# Patient Record
Sex: FEMALE | Race: WHITE | NOT HISPANIC OR LATINO | Employment: FULL TIME | URBAN - METROPOLITAN AREA
[De-identification: names, ages, dates, MRNs, and addresses within clinical notes are randomized per-mention and may not be internally consistent; named-entity substitution may affect disease eponyms.]

---

## 2018-01-07 ENCOUNTER — HOSPITAL ENCOUNTER (EMERGENCY)
Facility: HOSPITAL | Age: 48
Discharge: HOME | End: 2018-01-07
Attending: EMERGENCY MEDICINE | Admitting: EMERGENCY MEDICINE

## 2018-01-07 ENCOUNTER — APPOINTMENT (OUTPATIENT)
Dept: GENERAL RADIOLOGY | Facility: HOSPITAL | Age: 48
End: 2018-01-07

## 2018-01-07 VITALS
OXYGEN SATURATION: 93 % | DIASTOLIC BLOOD PRESSURE: 70 MMHG | RESPIRATION RATE: 18 BRPM | WEIGHT: 200 LBS | HEART RATE: 64 BPM | SYSTOLIC BLOOD PRESSURE: 107 MMHG | BODY MASS INDEX: 32.14 KG/M2 | HEIGHT: 66 IN | TEMPERATURE: 97.6 F

## 2018-01-07 DIAGNOSIS — M62.830 SPASM OF BACK MUSCLES: Primary | ICD-10-CM

## 2018-01-07 DIAGNOSIS — R07.89 ACUTE CHEST WALL PAIN: ICD-10-CM

## 2018-01-07 LAB
ALBUMIN SERPL-MCNC: 4.2 G/DL (ref 3.2–4.8)
ALBUMIN/GLOB SERPL: 1.5 G/DL (ref 1.5–2.5)
ALP SERPL-CCNC: 68 U/L (ref 25–100)
ALT SERPL W P-5'-P-CCNC: 16 U/L (ref 7–40)
ANION GAP SERPL CALCULATED.3IONS-SCNC: 4 MMOL/L (ref 3–11)
AST SERPL-CCNC: 14 U/L (ref 0–33)
B-HCG UR QL: NEGATIVE
BASOPHILS # BLD AUTO: 0.05 10*3/MM3 (ref 0–0.2)
BASOPHILS NFR BLD AUTO: 0.4 % (ref 0–1)
BILIRUB SERPL-MCNC: 0.3 MG/DL (ref 0.3–1.2)
BNP SERPL-MCNC: 23 PG/ML (ref 0–100)
BUN BLD-MCNC: 18 MG/DL (ref 9–23)
BUN/CREAT SERPL: 22.5 (ref 7–25)
CALCIUM SPEC-SCNC: 8.9 MG/DL (ref 8.7–10.4)
CHLORIDE SERPL-SCNC: 105 MMOL/L (ref 99–109)
CO2 SERPL-SCNC: 27 MMOL/L (ref 20–31)
CREAT BLD-MCNC: 0.8 MG/DL (ref 0.6–1.3)
DEPRECATED RDW RBC AUTO: 43.1 FL (ref 37–54)
EOSINOPHIL # BLD AUTO: 0.33 10*3/MM3 (ref 0–0.3)
EOSINOPHIL NFR BLD AUTO: 2.9 % (ref 0–3)
ERYTHROCYTE [DISTWIDTH] IN BLOOD BY AUTOMATED COUNT: 12.7 % (ref 11.3–14.5)
GFR SERPL CREATININE-BSD FRML MDRD: 77 ML/MIN/1.73
GLOBULIN UR ELPH-MCNC: 2.8 GM/DL
GLUCOSE BLD-MCNC: 122 MG/DL (ref 70–100)
HCT VFR BLD AUTO: 37.2 % (ref 34.5–44)
HGB BLD-MCNC: 12.3 G/DL (ref 11.5–15.5)
HOLD SPECIMEN: NORMAL
HOLD SPECIMEN: NORMAL
IMM GRANULOCYTES # BLD: 0.09 10*3/MM3 (ref 0–0.03)
IMM GRANULOCYTES NFR BLD: 0.8 % (ref 0–0.6)
INTERNAL NEGATIVE CONTROL: NEGATIVE
INTERNAL POSITIVE CONTROL: POSITIVE
LIPASE SERPL-CCNC: 30 U/L (ref 6–51)
LYMPHOCYTES # BLD AUTO: 2.45 10*3/MM3 (ref 0.6–4.8)
LYMPHOCYTES NFR BLD AUTO: 21.7 % (ref 24–44)
Lab: NORMAL
MCH RBC QN AUTO: 30.9 PG (ref 27–31)
MCHC RBC AUTO-ENTMCNC: 33.1 G/DL (ref 32–36)
MCV RBC AUTO: 93.5 FL (ref 80–99)
MONOCYTES # BLD AUTO: 0.84 10*3/MM3 (ref 0–1)
MONOCYTES NFR BLD AUTO: 7.4 % (ref 0–12)
NEUTROPHILS # BLD AUTO: 7.52 10*3/MM3 (ref 1.5–8.3)
NEUTROPHILS NFR BLD AUTO: 66.8 % (ref 41–71)
PLATELET # BLD AUTO: 290 10*3/MM3 (ref 150–450)
PMV BLD AUTO: 10.4 FL (ref 6–12)
POTASSIUM BLD-SCNC: 3.2 MMOL/L (ref 3.5–5.5)
PROT SERPL-MCNC: 7 G/DL (ref 5.7–8.2)
RBC # BLD AUTO: 3.98 10*6/MM3 (ref 3.89–5.14)
SODIUM BLD-SCNC: 136 MMOL/L (ref 132–146)
TROPONIN I SERPL-MCNC: 0 NG/ML (ref 0–0.07)
WBC NRBC COR # BLD: 11.28 10*3/MM3 (ref 3.5–10.8)
WHOLE BLOOD HOLD SPECIMEN: NORMAL
WHOLE BLOOD HOLD SPECIMEN: NORMAL

## 2018-01-07 PROCEDURE — 83880 ASSAY OF NATRIURETIC PEPTIDE: CPT | Performed by: EMERGENCY MEDICINE

## 2018-01-07 PROCEDURE — 83690 ASSAY OF LIPASE: CPT | Performed by: EMERGENCY MEDICINE

## 2018-01-07 PROCEDURE — 85025 COMPLETE CBC W/AUTO DIFF WBC: CPT | Performed by: EMERGENCY MEDICINE

## 2018-01-07 PROCEDURE — 99284 EMERGENCY DEPT VISIT MOD MDM: CPT

## 2018-01-07 PROCEDURE — 36415 COLL VENOUS BLD VENIPUNCTURE: CPT

## 2018-01-07 PROCEDURE — 80053 COMPREHEN METABOLIC PANEL: CPT | Performed by: EMERGENCY MEDICINE

## 2018-01-07 PROCEDURE — 84484 ASSAY OF TROPONIN QUANT: CPT

## 2018-01-07 PROCEDURE — 71045 X-RAY EXAM CHEST 1 VIEW: CPT

## 2018-01-07 PROCEDURE — 93005 ELECTROCARDIOGRAM TRACING: CPT

## 2018-01-07 RX ORDER — ASPIRIN 81 MG/1
324 TABLET, CHEWABLE ORAL ONCE
Status: DISCONTINUED | OUTPATIENT
Start: 2018-01-07 | End: 2018-01-07

## 2018-01-07 RX ORDER — BUPROPION HYDROCHLORIDE 100 MG/1
300 TABLET ORAL DAILY
COMMUNITY

## 2018-01-07 RX ORDER — SODIUM CHLORIDE 0.9 % (FLUSH) 0.9 %
10 SYRINGE (ML) INJECTION AS NEEDED
Status: DISCONTINUED | OUTPATIENT
Start: 2018-01-07 | End: 2018-01-07 | Stop reason: HOSPADM

## 2018-01-07 RX ORDER — CETIRIZINE HYDROCHLORIDE 10 MG/1
10 TABLET ORAL DAILY
COMMUNITY

## 2018-01-07 RX ORDER — LEVOTHYROXINE SODIUM 0.15 MG/1
150 TABLET ORAL DAILY
COMMUNITY

## 2018-01-07 RX ORDER — TRAMADOL HYDROCHLORIDE 50 MG/1
50 TABLET ORAL EVERY 6 HOURS PRN
Qty: 15 TABLET | Refills: 0 | Status: SHIPPED | OUTPATIENT
Start: 2018-01-07 | End: 2018-12-28

## 2018-01-07 RX ORDER — TRAMADOL HYDROCHLORIDE 50 MG/1
50 TABLET ORAL ONCE
Status: COMPLETED | OUTPATIENT
Start: 2018-01-07 | End: 2018-01-07

## 2018-01-07 RX ADMIN — TRAMADOL HYDROCHLORIDE 50 MG: 50 TABLET, COATED ORAL at 03:39

## 2018-01-07 RX ADMIN — Medication 10 ML: at 02:05

## 2018-01-07 NOTE — DISCHARGE INSTRUCTIONS
Patient advised to apply heat to muscles of chest and back. Perform regular stretching.     Take ultram as needed for pain not controlled by ibuprofen or tylenol.

## 2018-01-07 NOTE — ED PROVIDER NOTES
Subjective   HPI Comments: Ms. Myra Maldonado is a 47 year old female who presents to the ED with c/o CP. The patient reports that she has a long hx of food allergies, and will typically develop a pain in her chest and back when she eats a food she is allergic to. However, she notes that antihistamines and her inhaler almost always clear her sx up. Tonight, the patient reports eating pizza around 2200 and going to bed. However, while lying down the patient reports developing a sudden sharp, cramping pain in her left lateral chest. She notes that the pain radiates through her lower back (atypical for her), neck, and left shoulder. The patient tried her normal antihistamine/inhaler regimen, but denies any relief. The patient notes that her pain has been accompanied by a smothering sensation, and reports that she feels like she can't get a deep breath. The patient currently rates her pain a 3/10. When asked about other sx, the patient reports that she has had a cold for the past 2 weeks, with associated rhinorrhea, cough, and congestion. The patient denies any other acute sx at this time.    Patient is a 47 y.o. female presenting with chest pain.   History provided by:  Patient  Chest Pain   Pain location:  L lateral chest  Pain quality: sharp    Pain radiates to:  Lower back, neck and L shoulder  Pain severity:  Mild (3/10)  Onset quality:  Sudden  Progression:  Waxing and waning  Chronicity:  New  Relieved by:  Nothing  Worsened by:  Nothing  Ineffective treatments: antihistamines/inhaler.  Associated symptoms: cough and shortness of breath    Associated symptoms: no fever    Risk factors: obesity and smoking (former smoker)    Risk factors: no diabetes mellitus, no high cholesterol, no hypertension, not male, no prior DVT/PE and no surgery        Review of Systems   Constitutional: Negative for fever.   HENT: Positive for congestion and rhinorrhea.    Respiratory: Positive for cough and shortness of breath.     Cardiovascular: Positive for chest pain.   All other systems reviewed and are negative.      Past Medical History:   Diagnosis Date   • Disease of thyroid gland     hypothyroidism       Allergies   Allergen Reactions   • Other      Nuts, squash, fish, lettuce, chocolate, cauliflower, berries       Past Surgical History:   Procedure Laterality Date   • TONSILLECTOMY         History reviewed. No pertinent family history.    Social History     Social History   • Marital status:      Spouse name: N/A   • Number of children: N/A   • Years of education: N/A     Social History Main Topics   • Smoking status: Former Smoker     Types: Cigarettes   • Smokeless tobacco: Never Used   • Alcohol use Yes      Comment: socially   • Drug use: No   • Sexual activity: Defer     Other Topics Concern   • None     Social History Narrative   • None         Objective   Physical Exam   Constitutional: She is oriented to person, place, and time. She appears well-developed and well-nourished. No distress.   HENT:   Head: Normocephalic and atraumatic.   Eyes: Conjunctivae are normal. No scleral icterus.   Neck: Normal range of motion. Neck supple.   Cardiovascular: Normal rate, regular rhythm and normal heart sounds.  Exam reveals no gallop and no friction rub.    No murmur heard.  Pulmonary/Chest: Effort normal and breath sounds normal. No respiratory distress. She has no wheezes. She has no rales. She exhibits no tenderness.   Abdominal: Soft. Bowel sounds are normal. There is no tenderness. There is no guarding.   Musculoskeletal: Normal range of motion.        Lumbar back: She exhibits tenderness.   Reproducible pain to palpation to the left trapezius and bilateral paraspinal musculature of the lumbar back.   Neurological: She is alert and oriented to person, place, and time.   Skin: Skin is warm and dry. She is not diaphoretic.   Psychiatric: She has a normal mood and affect. Her behavior is normal.   Nursing note and vitals  reviewed.      Procedures         ED Course  ED Course       Recent Results (from the past 24 hour(s))   Comprehensive Metabolic Panel    Collection Time: 01/07/18  2:03 AM   Result Value Ref Range    Glucose 122 (H) 70 - 100 mg/dL    BUN 18 9 - 23 mg/dL    Creatinine 0.80 0.60 - 1.30 mg/dL    Sodium 136 132 - 146 mmol/L    Potassium 3.2 (L) 3.5 - 5.5 mmol/L    Chloride 105 99 - 109 mmol/L    CO2 27.0 20.0 - 31.0 mmol/L    Calcium 8.9 8.7 - 10.4 mg/dL    Total Protein 7.0 5.7 - 8.2 g/dL    Albumin 4.20 3.20 - 4.80 g/dL    ALT (SGPT) 16 7 - 40 U/L    AST (SGOT) 14 0 - 33 U/L    Alkaline Phosphatase 68 25 - 100 U/L    Total Bilirubin 0.3 0.3 - 1.2 mg/dL    eGFR Non African Amer 77 >60 mL/min/1.73    Globulin 2.8 gm/dL    A/G Ratio 1.5 1.5 - 2.5 g/dL    BUN/Creatinine Ratio 22.5 7.0 - 25.0    Anion Gap 4.0 3.0 - 11.0 mmol/L   Lipase    Collection Time: 01/07/18  2:03 AM   Result Value Ref Range    Lipase 30 6 - 51 U/L   Light Blue Top    Collection Time: 01/07/18  2:03 AM   Result Value Ref Range    Extra Tube hold for add-on    Green Top (Gel)    Collection Time: 01/07/18  2:03 AM   Result Value Ref Range    Extra Tube Hold for add-ons.    Lavender Top    Collection Time: 01/07/18  2:03 AM   Result Value Ref Range    Extra Tube hold for add-on    Gold Top - SST    Collection Time: 01/07/18  2:03 AM   Result Value Ref Range    Extra Tube Hold for add-ons.    CBC Auto Differential    Collection Time: 01/07/18  2:03 AM   Result Value Ref Range    WBC 11.28 (H) 3.50 - 10.80 10*3/mm3    RBC 3.98 3.89 - 5.14 10*6/mm3    Hemoglobin 12.3 11.5 - 15.5 g/dL    Hematocrit 37.2 34.5 - 44.0 %    MCV 93.5 80.0 - 99.0 fL    MCH 30.9 27.0 - 31.0 pg    MCHC 33.1 32.0 - 36.0 g/dL    RDW 12.7 11.3 - 14.5 %    RDW-SD 43.1 37.0 - 54.0 fl    MPV 10.4 6.0 - 12.0 fL    Platelets 290 150 - 450 10*3/mm3    Neutrophil % 66.8 41.0 - 71.0 %    Lymphocyte % 21.7 (L) 24.0 - 44.0 %    Monocyte % 7.4 0.0 - 12.0 %    Eosinophil % 2.9 0.0 - 3.0 %     Basophil % 0.4 0.0 - 1.0 %    Immature Grans % 0.8 (H) 0.0 - 0.6 %    Neutrophils, Absolute 7.52 1.50 - 8.30 10*3/mm3    Lymphocytes, Absolute 2.45 0.60 - 4.80 10*3/mm3    Monocytes, Absolute 0.84 0.00 - 1.00 10*3/mm3    Eosinophils, Absolute 0.33 (H) 0.00 - 0.30 10*3/mm3    Basophils, Absolute 0.05 0.00 - 0.20 10*3/mm3    Immature Grans, Absolute 0.09 (H) 0.00 - 0.03 10*3/mm3   POC Troponin, Rapid    Collection Time: 01/07/18  2:17 AM   Result Value Ref Range    Troponin I 0.00 0.00 - 0.07 ng/mL   POCT pregnancy, urine    Collection Time: 01/07/18  2:50 AM   Result Value Ref Range    HCG, Urine, QL Negative Negative    Lot Number [id number]     Internal Positive Control Positive     Internal Negative Control Negative      Note: In addition to lab results from this visit, the labs listed above may include labs taken at another facility or during a different encounter within the last 24 hours. Please correlate lab times with ED admission and discharge times for further clarification of the services performed during this visit.    XR Chest 1 View   Final Result     No acute findings visualized within the chest.      THIS DOCUMENT HAS BEEN ELECTRONICALLY SIGNED BY MARIELENA CHO MD        Vitals:    01/07/18 0244 01/07/18 0245 01/07/18 0330 01/07/18 0330   BP:  112/74  107/70   BP Location:       Patient Position:       Pulse: 65  64    Resp:   18    Temp:       TempSrc:       SpO2: 96%  93%    Weight:       Height:         Medications   sodium chloride 0.9 % flush 10 mL (10 mL Intravenous Given 1/7/18 0205)   traMADol (ULTRAM) tablet 50 mg (50 mg Oral Given 1/7/18 0339)     ECG/EMG Results (last 24 hours)     Procedure Component Value Units Date/Time    ECG 12 Lead [[id number]] Collected:  01/07/18 0147     Updated:  01/07/18 0148                      MDM  Number of Diagnoses or Management Options  Acute chest wall pain: new and requires workup  Spasm of back muscles: new and requires workup  Diagnosis management comments:  Patient's pain in the chest wall and the back is felt to be related to muscle spasm.    No acute laboratory abnormalities including normal troponin, and normal EKG with no ischemic findings.    Patient also is felt to have underlying anxiety that is contributing to the current presentation.  I'm unsure of how much this is contributing to the presentation.    DC home appearing well.     Will give ultram for pain not controlled by tylenol or ibuprofen.        Amount and/or Complexity of Data Reviewed  Clinical lab tests: ordered and reviewed  Tests in the radiology section of CPT®: ordered and reviewed  Obtain history from someone other than the patient: yes  Review and summarize past medical records: yes  Independent visualization of images, tracings, or specimens: yes    Patient Progress  Patient progress: stable      Final diagnoses:   Spasm of back muscles   Acute chest wall pain       Documentation assistance provided by juan c Ochoa.  Information recorded by the scribe was done at my direction and has been verified and validated by me.     Glen Ochoa  01/07/18 0312       Megan Bower MD  01/07/18 0413

## 2018-12-28 ENCOUNTER — OFFICE VISIT (OUTPATIENT)
Dept: ORTHOPEDIC SURGERY | Facility: CLINIC | Age: 48
End: 2018-12-28

## 2018-12-28 VITALS — BODY MASS INDEX: 32.14 KG/M2 | HEART RATE: 70 BPM | OXYGEN SATURATION: 98 % | WEIGHT: 200 LBS | HEIGHT: 66 IN

## 2018-12-28 DIAGNOSIS — M25.562 ACUTE PAIN OF LEFT KNEE: Primary | ICD-10-CM

## 2018-12-28 PROCEDURE — 99203 OFFICE O/P NEW LOW 30 MIN: CPT | Performed by: PHYSICIAN ASSISTANT

## 2018-12-28 NOTE — PROGRESS NOTES
"    [facility] [place] Orthopaedic Surgery Clinic Note    Subjective     Chief Complaint   Patient presents with   • Left Knee - Pain        HPI      Myra Maldonado is a 48 y.o. female.  Patient presents to orthopedic clinic for evaluation of her left knee, 7 days ago she was skiing and sustained a hyperflexion injury to her left knee.  She had noted \"pop\" as well as pain and swelling to the knee following the injury.  She notes some improvement in the swelling but still has considerable amount pain especially the posterior and medial aspect of the knee.  At this time she endorses pain scale maximum 2/10.  Severity the pain mild to moderate.  Quality pain dull, aching.  Associated symptoms swelling, stiffness, giving away.  Symptoms are worse with walking, standing, stair climbing and with sleep.  Current pain medication includes Tylenol and Advil.  No reported fever, chills, night sweats or other constitutional symptoms.  Denies any radiculopathy or paresthesias.  No reported subluxation or dislocation of the patella.        Past Medical History:   Diagnosis Date   • Disease of thyroid gland     hypothyroidism      Past Surgical History:   Procedure Laterality Date   •  SECTION     • TONSILLECTOMY        Family History   Problem Relation Age of Onset   • Osteoarthritis Mother      Social History     Socioeconomic History   • Marital status:      Spouse name: Not on file   • Number of children: Not on file   • Years of education: Not on file   • Highest education level: Not on file   Social Needs   • Financial resource strain: Not on file   • Food insecurity - worry: Not on file   • Food insecurity - inability: Not on file   • Transportation needs - medical: Not on file   • Transportation needs - non-medical: Not on file   Occupational History   • Not on file   Tobacco Use   • Smoking status: Former Smoker     Types: Cigarettes   • Smokeless tobacco: Never Used   Substance and Sexual Activity   • Alcohol use: Yes    " " Comment: socially   • Drug use: No   • Sexual activity: Defer   Other Topics Concern   • Not on file   Social History Narrative   • Not on file      Current Outpatient Medications on File Prior to Visit   Medication Sig Dispense Refill   • buPROPion (WELLBUTRIN) 100 MG tablet Take 300 mg by mouth Daily.     • cetirizine (zyrTEC) 10 MG tablet Take 10 mg by mouth Daily.     • levothyroxine (SYNTHROID, LEVOTHROID) 150 MCG tablet Take 150 mcg by mouth Daily.     • [DISCONTINUED] traMADol (ULTRAM) 50 MG tablet Take 1 tablet by mouth Every 6 (Six) Hours As Needed for Moderate Pain  or Severe Pain  for up to 15 doses. 15 tablet 0     No current facility-administered medications on file prior to visit.       Allergies   Allergen Reactions   • Levaquin [Levofloxacin] Unknown (See Comments)     Unknown     • Other      Nuts, squash, fish, lettuce, chocolate, cauliflower, berries        The following portions of the patient's history were reviewed and updated as appropriate: allergies, current medications, past family history, past medical history, past social history, past surgical history and problem list.    Review of Systems   Constitutional: Negative.    HENT: Negative.    Eyes: Negative.    Respiratory: Negative.    Cardiovascular: Negative.    Gastrointestinal: Negative.    Endocrine: Negative.    Genitourinary: Negative.    Musculoskeletal: Positive for arthralgias and joint swelling.   Skin: Negative.    Allergic/Immunologic: Positive for environmental allergies and food allergies.   Neurological: Negative.    Hematological: Negative.    Psychiatric/Behavioral: Negative.         Objective      Physical Exam  Pulse 70   Ht 167.6 cm (65.98\")   Wt 90.7 kg (200 lb)   SpO2 98%   BMI 32.30 kg/m²     Body mass index is 32.3 kg/m².        GENERAL APPEARANCE: awake, alert & oriented x 3, in no acute distress and well developed, well nourished  PSYCH: normal mood and affect  LUNGS:  breathing nonlabored, no wheezing  EYES: " sclera anicteric, pupils equal  CARDIOVASCULAR: palpable pulses dorsalis pedis, palpable posterior tibial bilaterally. Capillary refill less than 2 seconds  INTEGUMENTARY: skin intact, no clubbing, cyanosis  NEUROLOGIC:  Normal Sensation and reflexes             Ortho Exam  Peripheral Vascular:    Upper Extremity:   Inspection:  Left--no cyanotic nail beds Right--no cyanotic nail beds   Bilateral:  Pink nail beds with brisk capillary refill   Palpation:  Bilateral radial pulse normal    Musculoskeletal:  Global Assessment:  Overall assessment of Lower Extremity Muscle Strength and Tone:  Left quadriceps--4/5   Left hamstrings--5/5       Left tibialis anterior--5/5  Left gastroc-soleus--5/5  Left EHL--5/5      Lower Extremity:  Knee/Patella:  No digital clubbing or cyanosis.    Examination of left knee reveals:  Normal deep tendon reflexes, coordination, strength, tone, sensation.  No known fractures or deformities.    Inspection and Palpation:    Left knee:  Tenderness:  Positive medial joint line tenderness and posterior knee pain  Effusion:  Positive  Crepitus:  none  Pulses:  2+  Ecchymosis:  None  Warmth:  None       ROM:  Right:  Extension:0    Flexion:135  Left:  Extension:10     Flexion:115 with increased pain while performing any range of motion    Instability:    Left:  Lachman Test:  Positive anterior translation with an point noted, Varus stress test negative, Valgus stress test negative   Anterior Drawer Test:  Negative, Posterior Drawer Test:  Negative      Deformities/Malalignments/Discrepancies:    Left:  none  Right:  none    Functional Testing:    Left:  Corbin's test:  Positive  Patella grind test:  Negative  Q-angle:  Normal  Apprehension Sign:  Negative    Imaging/Studies  Imaging Results (last 7 days)     Procedure Component Value Units Date/Time    XR Knee 4+ View Left [[id number]] Resulted:  12/28/18 1111     Updated:  12/28/18 1111    Narrative:       Knee X-Ray  Indication: Pain    Upright  AP of bilateral knees. Lateral, skiers and Sunrise views of left   knee     Findings:  No fracture  No bony lesion  Normal soft tissues  Normal joint spaces    No prior studies were available for comparison.          Ordered plain film imaging of the left knee.  Images reviewed by Dr. Ortez.  No acute bony abnormality, fracture or dislocation.  Joint spaces are preserved.  See chart for official report.    Assessment/Plan        ICD-10-CM ICD-9-CM   1. Acute pain of left knee M25.562 719.46       Orders Placed This Encounter   Procedures   • XR Knee 4+ View Left   • MRI Knee Left Without Contrast        -Ordered an MRI of the left knee for further evaluation of meniscal pathology and ACL tear.  -Continue use of crutches to help assist with ambulation.  -Discussed ice and elevation for swelling control.  -Continue use of Tylenol and Advil as needed for pain and swelling control.  -Follow-up in our clinic once MRI has been performed to discuss results and treatment options.  -Question and concerns answered.    Medical Decision Making  Management Options : over-the-counter medicine  Data/Risk: radiology tests    Christy Cross PA-C  12/28/18  1:48 PM         EMR Dragon/Transcription disclaimer:  Much of this encounter note is an electronic transcription of spoken language to printed text. Electronic transcription of spoken language may permit erroneous, or at times, nonsensical words or phrases to be inadvertently transcribed. Although I have reviewed the note for such errors, some may still exist.

## 2019-01-04 DIAGNOSIS — M25.562 ACUTE PAIN OF LEFT KNEE: ICD-10-CM

## 2019-01-11 ENCOUNTER — OFFICE VISIT (OUTPATIENT)
Dept: ORTHOPEDIC SURGERY | Facility: CLINIC | Age: 49
End: 2019-01-11

## 2019-01-11 VITALS — HEART RATE: 76 BPM | OXYGEN SATURATION: 98 % | HEIGHT: 66 IN | BODY MASS INDEX: 32.14 KG/M2 | WEIGHT: 200 LBS

## 2019-01-11 DIAGNOSIS — S83.412D SPRAIN OF MEDIAL COLLATERAL LIGAMENT OF LEFT KNEE, SUBSEQUENT ENCOUNTER: ICD-10-CM

## 2019-01-11 DIAGNOSIS — M25.562 ACUTE PAIN OF LEFT KNEE: Primary | ICD-10-CM

## 2019-01-11 DIAGNOSIS — T14.8XXA CONTUSION OF BONE: ICD-10-CM

## 2019-01-11 DIAGNOSIS — S83.512D RUPTURE OF ANTERIOR CRUCIATE LIGAMENT OF LEFT KNEE, SUBSEQUENT ENCOUNTER: ICD-10-CM

## 2019-01-11 PROCEDURE — 99213 OFFICE O/P EST LOW 20 MIN: CPT | Performed by: PHYSICIAN ASSISTANT

## 2019-01-11 NOTE — PROGRESS NOTES
[facility] [place] Orthopa[facility] Surgery Clinic Note    Subjective     Follow-up of the Left Knee (MRI-19)      MIRELA Maldonado is a 48 y.o. female.  Patient returns today for follow-up evaluation of her left knee.  (Date of injury 18).  Patient reports that her left knee pain is feeling better.  She's noticed decrease in swelling as well as increasing range of motion.  At this time she denies significant pain to the knee.  Positive mild discomfort along medial aspect of the knee.  With the discomfort she describes it as dull and stabbing.  Associated symptoms stiffness.  Symptoms are worse with walking, standing, stair climbing.  She currently is using Advil for pain control.  She denies any numbness or tingling into the extremity.  No reported fever, chills, night sweats or other constitutional symptoms.     Past Medical History:   Diagnosis Date   • Disease of thyroid gland     hypothyroidism      Past Surgical History:   Procedure Laterality Date   •  SECTION     • TONSILLECTOMY        Family History   Problem Relation Age of Onset   • Osteoarthritis Mother      Social History     Socioeconomic History   • Marital status:      Spouse name: Not on file   • Number of children: Not on file   • Years of education: Not on file   • Highest education level: Not on file   Social Needs   • Financial resource strain: Not on file   • Food insecurity - worry: Not on file   • Food insecurity - inability: Not on file   • Transportation needs - medical: Not on file   • Transportation needs - non-medical: Not on file   Occupational History   • Not on file   Tobacco Use   • Smoking status: Former Smoker     Types: Cigarettes   • Smokeless tobacco: Never Used   Substance and Sexual Activity   • Alcohol use: Yes     Comment: socially   • Drug use: No   • Sexual activity: Defer   Other Topics Concern   • Not on file   Social History Narrative   • Not on file      Current Outpatient Medications on File Prior to  "Visit   Medication Sig Dispense Refill   • buPROPion (WELLBUTRIN) 100 MG tablet Take 300 mg by mouth Daily.     • cetirizine (zyrTEC) 10 MG tablet Take 10 mg by mouth Daily.     • levothyroxine (SYNTHROID, LEVOTHROID) 150 MCG tablet Take 150 mcg by mouth Daily.       No current facility-administered medications on file prior to visit.       Allergies   Allergen Reactions   • Levaquin [Levofloxacin] Unknown (See Comments)     Unknown     • Other      Nuts, squash, fish, lettuce, chocolate, cauliflower, berries        The following portions of the patient's history were reviewed and updated as appropriate: allergies, current medications, past family history, past medical history, past social history, past surgical history and problem list.    Review of Systems   Constitutional: Negative.    HENT: Negative.    Eyes: Negative.    Respiratory: Negative.    Cardiovascular: Negative.    Gastrointestinal: Negative.    Endocrine: Negative.    Genitourinary: Negative.    Musculoskeletal: Positive for arthralgias and joint swelling.   Skin: Negative.    Allergic/Immunologic: Negative.    Neurological: Negative.    Hematological: Negative.    Psychiatric/Behavioral: Negative.         Objective      Physical Exam  Pulse 76   Ht 167.6 cm (65.98\")   Wt 90.7 kg (200 lb)   SpO2 98%   BMI 32.30 kg/m²     Body mass index is 32.3 kg/m².    General  Mental Status - alert  General Appearance - cooperative, well groomed, not in acute distress  Orientation - Oriented X3  Build & Nutrition - well developed and well nourished  Posture - normal posture  Gait - in wheelchair, positive limp, has crutches     Integumentary  Global Assessment  Examination of related systems reveals - no lymphadenopathy  Ears:  No abnormality  Nose:  No mucous drainage  General Characteristics  Overall examination of the patient's skin reveals - no rashes, no evidence of scars, no suspicious lesions and no bruises.  Color - normal coloration of " skin.  Vascular: Brisk capillary refill in all extremities    Ortho Exam  Peripheral Vascular:    Upper Extremity:   Inspection:  Left--no cyanotic nail beds          Right--no cyanotic nail beds              Bilateral:  Pink nail beds with brisk capillary refill              Palpation:  Bilateral radial pulse normal     Musculoskeletal:  Global Assessment:  Overall assessment of Lower Extremity Muscle Strength and Tone:  Left quadriceps--4/5      Left hamstrings--5/5       Left tibialis anterior--5/5  Left gastroc-soleus--5/5  Left EHL--5/5     Lower Extremity:  Knee/Patella:  No digital clubbing or cyanosis.    Examination of left knee reveals:  Normal deep tendon reflexes, coordination, strength, tone, sensation.  No known fractures or deformities.     Inspection and Palpation:    Left knee:  Tenderness:  Positive medial joint line tenderness and pain along MCL  Effusion:  1+, improved  Crepitus:  none  Pulses:  2+  Ecchymosis:  None  Warmth:  None      ROM:  Left:  Extension:10     Flexion:125      Instability:    Left:  Lachman Test:  Positive, Varus stress test negative, Valgus stress test negative laxity or gapping but positive pain              Anterior Drawer Test:  Positive, Posterior Drawer Test:  Negative     Deformities/Malalignments/Discrepancies:    Left:  none  Right:  none     Functional Testing:  Left:  Corbin's test:  Improved pain, no click or catch  Patella grind test:  Negative  Q-angle:  Normal  Apprehension Sign:  Negative       Imaging/Studies    Imaging Results (last 24 hours)     ** No results found for the last 24 hours. **      Dr. Ortez and I reviewed her MRI (noncontrast) from [facility] that was performed on 1/3/19.  Positive ACL tear.  Menisci appear intact.  Evidence of effusion.  Edema localized along MCL without evidence of a tear.  Positive bony contusion noted posterior tibia.  Images will be downloaded into the system.    Assessment:  1. Acute pain of left  knee    2. Rupture of anterior cruciate ligament of left knee, subsequent encounter    3. Sprain of medial collateral ligament of left knee, subsequent encounter    4. Contusion of bone        Plan:  1. Patient will be referred to physical therapy to work on inflammation/pain control, range of motion with eventual strengthening along with modalities for the MCL sprain.    2. She is to continue use of crutches as long as she's limping.  She can transition off the crutches when she has no pain with ambulation.    3. She was provided a hinged knee sleeve for support and stability.    4. Continue over-the-counter medication as needed for discomfort.  5. Right now with regards to her ACL tear will continue to watch and if it causes pain or instability in the future and we discussed possible reconstruction.  6. Follow-up in 6 weeks for repeat evaluation, sooner if issues arise or symptoms worsen/change.  7. Question and concerns answered.      Medical Decision Making  Management Options : over-the-counter medicine and physical/occupational therapy  Data/Risk: radiology tests    Christy Cross PA-C  01/11/19  1:17 PM

## 2019-01-22 ENCOUNTER — TELEPHONE (OUTPATIENT)
Dept: ORTHOPEDIC SURGERY | Facility: CLINIC | Age: 49
End: 2019-01-22

## 2019-01-22 NOTE — TELEPHONE ENCOUNTER
I spoke with the patient this morning and she is going to refax the parking pass.  I gave her the back fax and my name to put on it.  As soon as I get I will get signed and fax back.  Jadyn

## 2019-01-22 NOTE — TELEPHONE ENCOUNTER
PT CALLING BECAUSE SHE NEEDS A PARKING PASS SENT TO HER HR. SHE SAID SHE HAD FAXED OVER A FORM WITH THE FAX WHERE IT NEEDED TO GO. SHE SAID SHE SPOKE WITH SOMEONE TO CONFIRM LAST WEEK. HER NUMBER [phone].

## 2019-01-23 NOTE — TELEPHONE ENCOUNTER
I spoke with Myra to let her know that I didn't get the parking permit.  She is going to have her office send again.  Jadyn

## 2019-02-12 ENCOUNTER — OFFICE VISIT (OUTPATIENT)
Dept: ORTHOPEDIC SURGERY | Facility: CLINIC | Age: 49
End: 2019-02-12

## 2019-02-12 VITALS — HEIGHT: 66 IN | HEART RATE: 82 BPM | WEIGHT: 199.96 LBS | OXYGEN SATURATION: 98 % | BODY MASS INDEX: 32.14 KG/M2

## 2019-02-12 DIAGNOSIS — M25.562 ACUTE PAIN OF LEFT KNEE: Primary | ICD-10-CM

## 2019-02-12 DIAGNOSIS — T14.8XXA CONTUSION OF BONE: ICD-10-CM

## 2019-02-12 DIAGNOSIS — S83.412D SPRAIN OF MEDIAL COLLATERAL LIGAMENT OF LEFT KNEE, SUBSEQUENT ENCOUNTER: ICD-10-CM

## 2019-02-12 DIAGNOSIS — S83.512D RUPTURE OF ANTERIOR CRUCIATE LIGAMENT OF LEFT KNEE, SUBSEQUENT ENCOUNTER: ICD-10-CM

## 2019-02-12 PROCEDURE — 99214 OFFICE O/P EST MOD 30 MIN: CPT | Performed by: ORTHOPAEDIC SURGERY

## 2019-02-12 NOTE — PROGRESS NOTES
[facility] [place] [facility] [place]    Subjective     Pain of the Left Knee (Patient originally hurt herself 2018, skiing. Previous MRI in epic. Patient mentions she still has pain on a scale 4/10. Taking OTC tylenol. Elevation at night helps with pain.)      MIRELA Maldonado is a 48 y.o. female.  Patient former patient of mine who is seeing me for the first time for an injury to her left knee that occurred while skiing in 2018.  She has been seeing Christy and Dr. Ortez to date for this injury.  Patient has been in therapy and is just now gotten off the crutches but still feels weak and unstable with the left knee.  She tells me that she has a desire to go back to skiing and playing tennis and being active with her family and doesn't want the left knee to limit her.      Past Medical History:   Diagnosis Date   • Disease of thyroid gland     hypothyroidism      Past Surgical History:   Procedure Laterality Date   •  SECTION     • TONSILLECTOMY        Family History   Problem Relation Age of Onset   • Osteoarthritis Mother      Social History     Socioeconomic History   • Marital status:      Spouse name: Not on file   • Number of children: Not on file   • Years of education: Not on file   • Highest education level: Not on file   Social Needs   • Financial resource strain: Not on file   • Food insecurity - worry: Not on file   • Food insecurity - inability: Not on file   • Transportation needs - medical: Not on file   • Transportation needs - non-medical: Not on file   Occupational History   • Not on file   Tobacco Use   • Smoking status: Former Smoker     Types: Cigarettes   • Smokeless tobacco: Never Used   Substance and Sexual Activity   • Alcohol use: Yes     Comment: socially   • Drug use: No   • Sexual activity: Defer   Other Topics Concern   • Not on file   Social History Narrative   • Not on file      Current Outpatient Medications on File Prior to Visit  "  Medication Sig Dispense Refill   • buPROPion (WELLBUTRIN) 100 MG tablet Take 300 mg by mouth Daily.     • cetirizine (zyrTEC) 10 MG tablet Take 10 mg by mouth Daily.     • levothyroxine (SYNTHROID, LEVOTHROID) 150 MCG tablet Take 150 mcg by mouth Daily.       No current facility-administered medications on file prior to visit.       Allergies   Allergen Reactions   • Levaquin [Levofloxacin] Unknown (See Comments)     Unknown     • Other      Nuts, squash, fish, lettuce, chocolate, cauliflower, berries        The following portions of the patient's history were reviewed and updated as appropriate: allergies, current medications, past family history, past medical history, past social history, past surgical history and problem list.    Review of Systems   Constitutional: Positive for activity change.   HENT: Negative.    Eyes: Negative.    Respiratory: Negative.    Cardiovascular: Negative.    Gastrointestinal: Negative.    Endocrine: Negative.    Genitourinary: Negative.    Musculoskeletal: Positive for arthralgias, gait problem and joint swelling.   Skin: Negative.    Allergic/Immunologic: Negative.    Hematological: Negative.    Psychiatric/Behavioral: Negative.         Objective      Physical Exam  Pulse 82   Ht 167.6 cm (65.98\")   Wt 90.7 kg (199 lb 15.3 oz)   SpO2 98%   BMI 32.29 kg/m²     Body mass index is 32.29 kg/m².    General  Mental Status - alert  General Appearance - cooperative, well groomed, not in acute distress  Orientation - Oriented X3  Build & Nutrition - well developed and well nourished  Posture - normal posture  Gait - normal gait     Integumentary  Global Assessment  Examination of related systems reveals - no lymphadenopathy  Ears:  No abnormality  Nose:  No mucous drainage  General Characteristics  Overall examination of the patient's skin reveals - no rashes, no evidence of scars, no suspicious lesions and no bruises.  Color - normal coloration of skin.  Vascular: Brisk capillary " refill in all extremities    Ortho Exam  Range of motion 5-110   Patient opens up slightly to valgus force at 30° with an endpoint   Positive Lachman   Negative anterior drawer   Minimal effusion   No joint line tenderness     Imaging/Studies  We have reviewed the x-ray of the patient's left knee from 12/28/2018 and the MRI of her left knee from 1/3/2019.  Patient has a bone contusion to the proximal posterior tibia and a complete rupture of the ACL without ligamentous injury.  There is edema but no disruption of the MCL.      Assessment:  1. Acute pain of left knee    2. Rupture of anterior cruciate ligament of left knee, subsequent encounter    3. Sprain of medial collateral ligament of left knee, subsequent encounter    4. Contusion of bone        Plan:  1. Continue over-the-counter medication as needed for discomfort  2. Patient does not appear to be medically ready at this point to undergo ACL reconstruction and should continue to work on her quadriceps strength, proprioceptive training, and gait  3. Would like for her motion and strength to be as normal as possible before considering surgery.  4. I have told the patient that I will discuss the case with Dr. Ortez and let him know that the patient appears more interested then he may think to pursue ACL reconstruction because of her desire to go back to skiing, tennis, and other pivoting sports.    5. She would most likely pursue the surgery sometime in April 2019 after she returns from a trip to [place] with her children.    Medical Decision Making  Management Options : over-the-counter medicine and physical/occupational therapy  Data/Risk: radiology tests, discussion with another health care provider and independent visualization of imaging, lab tests, or EMG/NCV    Alexis Chaudhary MD  02/12/19  5:52 PM

## 2019-02-13 ENCOUNTER — TELEPHONE (OUTPATIENT)
Dept: ORTHOPEDIC SURGERY | Facility: CLINIC | Age: 49
End: 2019-02-13

## 2019-02-13 NOTE — TELEPHONE ENCOUNTER
PATIENT CALLED AND ASKED IF WHEN FAXING PARKING PERMIT TO NOT INCLUDE A COVER SHEET BECAUSE THE LAST TIME THEY ONLY RECEIVED THE COVER SHEET AND NOT THE ACTUAL PARKING PERMIT.

## 2019-02-14 NOTE — TELEPHONE ENCOUNTER
I just spoke with the patient to let her know that I have tried to fax form - without a cover letter - 10 times this am with no luck.  She would like for me to mail her a copy.  I put a copy in the mail today and put one to be scanned into the patients charts for future reference if needed.  Jadyn

## 2019-04-10 ENCOUNTER — OFFICE VISIT (OUTPATIENT)
Dept: ORTHOPEDIC SURGERY | Facility: CLINIC | Age: 49
End: 2019-04-10

## 2019-04-10 VITALS — HEIGHT: 66 IN | WEIGHT: 216.93 LBS | OXYGEN SATURATION: 97 % | BODY MASS INDEX: 34.86 KG/M2 | HEART RATE: 76 BPM

## 2019-04-10 DIAGNOSIS — S83.412D SPRAIN OF MEDIAL COLLATERAL LIGAMENT OF LEFT KNEE, SUBSEQUENT ENCOUNTER: ICD-10-CM

## 2019-04-10 DIAGNOSIS — T14.8XXA CONTUSION OF BONE: ICD-10-CM

## 2019-04-10 DIAGNOSIS — S83.512D RUPTURE OF ANTERIOR CRUCIATE LIGAMENT OF LEFT KNEE, SUBSEQUENT ENCOUNTER: Primary | ICD-10-CM

## 2019-04-10 PROCEDURE — 99213 OFFICE O/P EST LOW 20 MIN: CPT | Performed by: ORTHOPAEDIC SURGERY

## 2019-04-10 NOTE — PROGRESS NOTES
[facility] – [place] [facility] [place]    Subjective     Chief Complaint   Patient presents with   • Left Knee - Follow-up     2 months        HPI      Myra Maldonado is a 49 y.o. female.  Her left knee ACL 4 months ago skiing she also had an MCL injury and bone bruise currently her pain is 0.  She has no current instability.  She like to get back to skiing and tennis.  She had a lot of questions.  She is here with her .  She feels much better.        Past Medical History:   Diagnosis Date   • Disease of thyroid gland     hypothyroidism      Past Surgical History:   Procedure Laterality Date   •  SECTION     • TONSILLECTOMY        Family History   Problem Relation Age of Onset   • Osteoarthritis Mother      Social History     Socioeconomic History   • Marital status:      Spouse name: Not on file   • Number of children: Not on file   • Years of education: Not on file   • Highest education level: Not on file   Tobacco Use   • Smoking status: Former Smoker     Types: Cigarettes   • Smokeless tobacco: Never Used   Substance and Sexual Activity   • Alcohol use: Yes     Comment: socially   • Drug use: No   • Sexual activity: Defer      Current Outpatient Medications on File Prior to Visit   Medication Sig Dispense Refill   • buPROPion (WELLBUTRIN) 100 MG tablet Take 300 mg by mouth Daily.     • cetirizine (zyrTEC) 10 MG tablet Take 10 mg by mouth Daily.     • levothyroxine (SYNTHROID, LEVOTHROID) 150 MCG tablet Take 150 mcg by mouth Daily.       No current facility-administered medications on file prior to visit.       Allergies   Allergen Reactions   • Levaquin [Levofloxacin] Unknown (See Comments)     Unknown     • Other      Nuts, squash, fish, lettuce, chocolate, cauliflower, berries        The following portions of the patient's history were reviewed and updated as appropriate: allergies, current medications, past family history, past medical history, past social history, past surgical history  "and problem list.    Review of Systems   Constitutional: Negative.    HENT: Negative.    Eyes: Negative.    Respiratory: Negative.    Cardiovascular: Negative.    Gastrointestinal: Negative.    Endocrine: Negative.    Genitourinary: Negative.    Musculoskeletal: Positive for arthralgias.   Skin: Negative.    Allergic/Immunologic: Negative.    Neurological: Negative.    Hematological: Negative.    Psychiatric/Behavioral: Negative.         Objective      Physical Exam  Pulse 76   Ht 167.6 cm (65.98\")   Wt 98.4 kg (216 lb 14.9 oz)   SpO2 97%   BMI 35.03 kg/m²     Body mass index is 35.03 kg/m².        GENERAL APPEARANCE: awake, alert & oriented x 3, in no acute distress and well developed, well nourished  PSYCH: normal mood and affect  LUNGS:  breathing nonlabored, no wheezing  EYES: sclera anicteric, pupils equal  CARDIOVASCULAR: palpable pulses dorsalis pedis, palpable posterior tibial bilaterally. Capillary refill less than 2 seconds  INTEGUMENTARY: skin intact, no clubbing, cyanosis  NEUROLOGIC:  Normal Sensation and reflexes             Ortho Exam  Peripheral Vascular:    Upper Extremity:   Inspection:  Left--no cyanotic nail beds Right--no cyanotic nail beds   Bilateral:  Pink nail beds with brisk capillary refill   Palpation:  Bilateral radial pulse normal    Musculoskeletal:  Global Assessment:  Overall assessment of Lower Extremity Muscle Strength and Tone:  Left quadriceps--5/5   Left hamstrings--5/5       Left tibialis anterior--5/5  Left gastroc-soleus--5/5  Left EHL--5/5      Lower Extremity:  Knee/Patella:  No digital clubbing or cyanosis.    Examination of left knee reveals:  Normal deep tendon reflexes, coordination, strength, tone, sensation.  No known fractures or deformities.    Inspection and Palpation:    Left knee:  Tenderness:  none  Effusion:  none  Crepitus:  none  Pulses:  2+  Ecchymosis:  None  Warmth:  None       ROM:  Right:  Extension:0    Flexion:135  Left:  Extension:0     " Flexion:135    Instability:    Left:  Lachman Test: Positive, Varus stress test negative, Valgus stress test negative       Deformities/Malalignments/Discrepancies:    Left:  none  Right:  none    Functional Testing:    Left:  Corbin's test:  Negative  Patella grind test:  Negative  Q-angle:  Normal  Apprehension Sign:  Negative    Imaging/Studies  Imaging Results (last 7 days)     ** No results found for the last 168 hours. **      I viewed her MRI from January 11 which shows an ACL tear MCL sprain and bone bruise    Assessment/Plan        ICD-10-CM ICD-9-CM   1. Rupture of anterior cruciate ligament of left knee, subsequent encounter S83.512D V58.89     844.2   2. Sprain of medial collateral ligament of left knee, subsequent encounter S83.412D V58.89     844.1   3. Contusion of bone T14.8XXA 924.9     We had a long discussion about different treatment options including surgery continued therapy and use of a brace.  At this point she like to try nonoperative treatment rather than surgery.  I will see her back if she has complaints of instability.  Medical Decision Making  Management Options : over-the-counter medicine and physical/occupational therapy  Data/Risk: radiology tests and independent visualization of imaging, lab tests, or EMG/NCV    Junior Ortez MD  04/10/19  4:26 PM         EMR Dragon/Transcription disclaimer:  Much of this encounter note is an electronic transcription of spoken language to printed text. Electronic transcription of spoken language may permit erroneous, or at times, nonsensical words or phrases to be inadvertently transcribed. Although I have reviewed the note for such errors, some may still exist.

## 2022-10-06 ENCOUNTER — TRANSCRIBE ORDERS (OUTPATIENT)
Dept: ADMINISTRATIVE | Facility: HOSPITAL | Age: 52
End: 2022-10-06

## 2022-10-06 DIAGNOSIS — Z12.31 VISIT FOR SCREENING MAMMOGRAM: Primary | ICD-10-CM

## 2022-10-07 ENCOUNTER — HOSPITAL ENCOUNTER (OUTPATIENT)
Dept: MAMMOGRAPHY | Facility: HOSPITAL | Age: 52
Discharge: HOME | End: 2022-10-07
Admitting: PHYSICIAN ASSISTANT

## 2022-10-07 DIAGNOSIS — Z12.31 VISIT FOR SCREENING MAMMOGRAM: ICD-10-CM

## 2022-10-07 PROCEDURE — 77067 SCR MAMMO BI INCL CAD: CPT

## 2022-10-07 PROCEDURE — 77067 SCR MAMMO BI INCL CAD: CPT | Performed by: RADIOLOGY

## 2022-10-07 PROCEDURE — 77063 BREAST TOMOSYNTHESIS BI: CPT

## 2022-10-07 PROCEDURE — 77063 BREAST TOMOSYNTHESIS BI: CPT | Performed by: RADIOLOGY

## 2024-09-06 ENCOUNTER — TRANSCRIBE ORDERS (OUTPATIENT)
Dept: ADMINISTRATIVE | Facility: HOSPITAL | Age: 54
End: 2024-09-06
Payer: COMMERCIAL

## 2024-09-06 DIAGNOSIS — Z12.31 VISIT FOR SCREENING MAMMOGRAM: Primary | ICD-10-CM

## 2024-09-19 ENCOUNTER — TELEPHONE (OUTPATIENT)
Dept: PHYSICAL THERAPY | Facility: OTHER | Age: 54
End: 2024-09-19
Payer: COMMERCIAL

## 2024-09-23 LAB
NCCN CRITERIA FLAG: NORMAL
TYRER CUZICK SCORE: 11.5

## 2024-09-27 ENCOUNTER — HOSPITAL ENCOUNTER (OUTPATIENT)
Dept: MAMMOGRAPHY | Facility: HOSPITAL | Age: 54
Discharge: HOME | End: 2024-09-27
Payer: COMMERCIAL

## 2024-09-27 DIAGNOSIS — Z12.31 VISIT FOR SCREENING MAMMOGRAM: ICD-10-CM

## 2024-09-27 PROCEDURE — 77067 SCR MAMMO BI INCL CAD: CPT

## 2024-09-27 PROCEDURE — 77063 BREAST TOMOSYNTHESIS BI: CPT

## 2024-11-20 ENCOUNTER — HOSPITAL ENCOUNTER (OUTPATIENT)
Facility: HOSPITAL | Age: 54
Discharge: HOME | End: 2024-11-20
Payer: COMMERCIAL

## 2024-11-20 DIAGNOSIS — R92.8 ABNORMAL MAMMOGRAM: ICD-10-CM

## 2024-11-20 PROCEDURE — 76642 ULTRASOUND BREAST LIMITED: CPT | Performed by: RADIOLOGY

## 2024-11-20 PROCEDURE — 77061 BREAST TOMOSYNTHESIS UNI: CPT | Performed by: RADIOLOGY

## 2024-11-20 PROCEDURE — G0279 TOMOSYNTHESIS, MAMMO: HCPCS

## 2024-11-20 PROCEDURE — 77065 DX MAMMO INCL CAD UNI: CPT | Performed by: RADIOLOGY

## 2024-11-20 PROCEDURE — 76642 ULTRASOUND BREAST LIMITED: CPT

## 2024-11-20 PROCEDURE — 77065 DX MAMMO INCL CAD UNI: CPT

## 2025-05-30 ENCOUNTER — HOSPITAL ENCOUNTER (OUTPATIENT)
Dept: MAMMOGRAPHY | Facility: HOSPITAL | Age: 55
Discharge: HOME | End: 2025-05-30
Admitting: PHYSICIAN ASSISTANT
Payer: COMMERCIAL

## 2025-05-30 DIAGNOSIS — R92.8 ABNORMAL MAMMOGRAM: ICD-10-CM

## 2025-05-30 PROCEDURE — 77065 DX MAMMO INCL CAD UNI: CPT
